# Patient Record
Sex: FEMALE | Race: BLACK OR AFRICAN AMERICAN | NOT HISPANIC OR LATINO | URBAN - METROPOLITAN AREA
[De-identification: names, ages, dates, MRNs, and addresses within clinical notes are randomized per-mention and may not be internally consistent; named-entity substitution may affect disease eponyms.]

---

## 2024-03-09 ENCOUNTER — HOSPITAL ENCOUNTER (EMERGENCY)
Facility: HOSPITAL | Age: 24
Discharge: HOME | End: 2024-03-09
Payer: COMMERCIAL

## 2024-03-09 DIAGNOSIS — B37.31 YEAST VAGINITIS: Primary | ICD-10-CM

## 2024-03-09 LAB
APPEARANCE UR: ABNORMAL
BACTERIA #/AREA URNS AUTO: ABNORMAL /HPF
BILIRUB UR STRIP.AUTO-MCNC: NEGATIVE MG/DL
CLUE CELLS SPEC QL WET PREP: ABNORMAL
COLOR UR: YELLOW
GLUCOSE UR STRIP.AUTO-MCNC: NEGATIVE MG/DL
HCG UR QL IA.RAPID: NEGATIVE
KETONES UR STRIP.AUTO-MCNC: NEGATIVE MG/DL
LEUKOCYTE ESTERASE UR QL STRIP.AUTO: ABNORMAL
MUCOUS THREADS #/AREA URNS AUTO: ABNORMAL /LPF
NITRITE UR QL STRIP.AUTO: NEGATIVE
PH UR STRIP.AUTO: 6 [PH]
PROT UR STRIP.AUTO-MCNC: NEGATIVE MG/DL
RBC # UR STRIP.AUTO: NEGATIVE /UL
RBC #/AREA URNS AUTO: ABNORMAL /HPF
SP GR UR STRIP.AUTO: 1.02
SQUAMOUS #/AREA URNS AUTO: ABNORMAL /HPF
T VAGINALIS SPEC QL WET PREP: ABNORMAL
UROBILINOGEN UR STRIP.AUTO-MCNC: 4 MG/DL
WBC #/AREA URNS AUTO: ABNORMAL /HPF
WBC VAG QL WET PREP: >50
YEAST VAG QL WET PREP: PRESENT

## 2024-03-09 PROCEDURE — 81025 URINE PREGNANCY TEST: CPT | Performed by: PHYSICIAN ASSISTANT

## 2024-03-09 PROCEDURE — 81001 URINALYSIS AUTO W/SCOPE: CPT | Performed by: PHYSICIAN ASSISTANT

## 2024-03-09 PROCEDURE — 87210 SMEAR WET MOUNT SALINE/INK: CPT | Performed by: PHYSICIAN ASSISTANT

## 2024-03-09 PROCEDURE — 87800 DETECT AGNT MULT DNA DIREC: CPT | Mod: AHULAB | Performed by: PHYSICIAN ASSISTANT

## 2024-03-09 PROCEDURE — 99283 EMERGENCY DEPT VISIT LOW MDM: CPT

## 2024-03-09 PROCEDURE — 87086 URINE CULTURE/COLONY COUNT: CPT | Mod: AHULAB | Performed by: PHYSICIAN ASSISTANT

## 2024-03-09 RX ORDER — FLUCONAZOLE 150 MG/1
150 TABLET ORAL ONCE
Qty: 1 TABLET | Refills: 0 | Status: SHIPPED | OUTPATIENT
Start: 2024-03-09 | End: 2024-03-11 | Stop reason: SDUPTHER

## 2024-03-09 NOTE — ED PROVIDER NOTES
HPI   No chief complaint on file.      Initial documentation evaluation on downtime.  Stable vital signs.    Unprotected sex 4 days ago.  Vaginal discomfort discharge.  Concern of yeast infection.  Also STD concern/exposure.  No abdominal pelvic pain.  Last menses 4 weeks ago.  No urinary symptoms.  Denies pregnancy.  Not on birth control.  Takes medication for anxiety.  No known allergies.    PMHX: HPI/EMR reviewed past medical history medications allergies.    REVIEW OF SYSTEMS: Positive and pertinent negatives as per HPI.  All other systems reviewed are negative    PHYSICAL EXAM:   General: Vitals noted. No distress. Afebrile  Constitutional: Well appearing, well nourished, awake, alert, oriented to person, place, time/situation and in no apparent distress.  Skin:  Intact, warm and dry skin.   HEENT: Normocephalic, atraumatic, No facial asymmetry. Eyelids without lesions. Pupils equal and round. EOM's intact.  Conjunctiva clear with no redness or exudates. Cornea clear. Sclerae anicteric. Hearing grossly intact. Normal phonation. Swallows and speaks without difficulty  Neck: Supple, full range of motion, no asymmetry or obvious swelling, no stridor.  Cardiovascular:  Blood pressure noted. Normal heart rate  Respiratory: No respiratory distress. Easy unlabored respirations. Speaks easily and clearly in full sentences  Abdomen: Soft nontender  Pelvic exam: Declined exam.  Will self swab  Musculoskeletal: Full range of motion of all extremities, symmetrical strength in all major muscle groups. No obvious deformities.  Vascular: No cyanosis, no pallor, no edema  Neurological: Normal mental status, alert and oriented x3, coherent and appropriate, no motor deficits. Ambulatory  Psychiatric: Appropriate mood and affect    Disclaimer: Portions of this note were dictated by speech recognition. An attempt at proof reading was made to minimize errors. Errors in transcription may be present.  Please call if  questions        History provided by:  Patient   used: No                        No data recorded                   Patient History   No past medical history on file.  No past surgical history on file.  No family history on file.  Social History     Tobacco Use    Smoking status: Not on file    Smokeless tobacco: Not on file   Substance Use Topics    Alcohol use: Not on file    Drug use: Not on file       Physical Exam   ED Triage Vitals   Temp Pulse Resp BP   -- -- -- --      SpO2 Temp src Heart Rate Source Patient Position   -- -- -- --      BP Location FiO2 (%)     -- --       Physical Exam    ED Course & [facility]   ED Course as of 03/09/24 1833   Sat Mar 09, 2024   1832 Not pregnant.  Wet prep positive for yeast.  Negative for trichomoniasis and clue cells.  Urinalysis leukocyte Estrace.  Patient is asymptomatic no urinary symptoms. [GA]      ED Course User Index  [GA] Teddy Haro PA-C         Diagnoses as of 03/09/24 1833   Yeast vaginitis     Labs Reviewed   WET PREP, GENITAL - Abnormal       Result Value    Trichomonas None Seen      Clue Cells None Seen      Yeast Present (*)     WBC >50     C. TRACHOMATIS + N. GONORRHOEAE, AMPLIFIED   URINALYSIS WITH REFLEX CULTURE AND MICROSCOPIC    Narrative:     The following orders were created for panel order Urinalysis with Reflex Culture and Microscopic.  Procedure                               Abnormality         Status                     ---------                               -----------         ------                     Urinalysis with Reflex C...[[id number]]                      In process                 Extra Urine Gray Tube[[id number]]                            In process                   Please view results for these tests on the individual orders.   HCG, URINE, QUALITATIVE   URINALYSIS WITH REFLEX CULTURE AND MICROSCOPIC   EXTRA URINE GRAY TUBE      Medical Decision Making  Evaluation consistent yeast vaginitis.  Prescribed  Diflucan.    Patient advised STD testing pending 3 days.  Patient left prior to results of urinalysis.  Positive leukocyte Estrace.  Patient has no UTI symptoms.  Stable for discharge outpatient management.    Amount and/or Complexity of Data Reviewed  Labs: ordered. Decision-making details documented in ED Course.     Details: As above        Procedure  Procedures     Teddy Haro PA-C  03/09/24 2052

## 2024-03-10 LAB — HOLD SPECIMEN: NORMAL

## 2024-03-11 LAB
BACTERIA UR CULT: NORMAL
C TRACH RRNA SPEC QL NAA+PROBE: NEGATIVE
N GONORRHOEA DNA SPEC QL PROBE+SIG AMP: NEGATIVE

## 2024-03-11 RX ORDER — FLUCONAZOLE 150 MG/1
150 TABLET ORAL ONCE
Qty: 1 TABLET | Refills: 0 | Status: SHIPPED | OUTPATIENT
Start: 2024-03-11 | End: 2024-03-11

## 2024-03-11 RX ORDER — FLUCONAZOLE 150 MG/1
150 TABLET ORAL ONCE
Qty: 1 TABLET | Refills: 0 | Status: SHIPPED | OUTPATIENT
Start: 2024-03-11 | End: 2024-03-11 | Stop reason: SDUPTHER

## 2024-09-10 ENCOUNTER — APPOINTMENT (OUTPATIENT)
Dept: OBSTETRICS AND GYNECOLOGY | Facility: CLINIC | Age: 24
End: 2024-09-10
Payer: COMMERCIAL

## 2025-08-07 ENCOUNTER — HOSPITAL ENCOUNTER (EMERGENCY)
Facility: HOSPITAL | Age: 25
Discharge: HOME | End: 2025-08-07
Payer: COMMERCIAL

## 2025-08-07 VITALS
RESPIRATION RATE: 16 BRPM | DIASTOLIC BLOOD PRESSURE: 80 MMHG | HEIGHT: 69 IN | SYSTOLIC BLOOD PRESSURE: 107 MMHG | BODY MASS INDEX: 23.11 KG/M2 | OXYGEN SATURATION: 98 % | TEMPERATURE: 98.7 F | HEART RATE: 77 BPM | WEIGHT: 156 LBS

## 2025-08-07 DIAGNOSIS — B96.89 BACTERIAL VAGINOSIS: ICD-10-CM

## 2025-08-07 DIAGNOSIS — Z30.09 BIRTH CONTROL COUNSELING: ICD-10-CM

## 2025-08-07 DIAGNOSIS — N76.0 BACTERIAL VAGINOSIS: ICD-10-CM

## 2025-08-07 DIAGNOSIS — N92.6 MISSED MENSES: ICD-10-CM

## 2025-08-07 DIAGNOSIS — Z20.2 POSSIBLE EXPOSURE TO STI: Primary | ICD-10-CM

## 2025-08-07 LAB
APPEARANCE UR: CLEAR
BILIRUB UR STRIP.AUTO-MCNC: NEGATIVE MG/DL
CLUE CELLS SPEC QL WET PREP: PRESENT
COLOR UR: YELLOW
GLUCOSE UR STRIP.AUTO-MCNC: NORMAL MG/DL
HCG UR QL IA.RAPID: NEGATIVE
KETONES UR STRIP.AUTO-MCNC: NEGATIVE MG/DL
LEUKOCYTE ESTERASE UR QL STRIP.AUTO: ABNORMAL
MUCOUS THREADS #/AREA URNS AUTO: NORMAL /LPF
NITRITE UR QL STRIP.AUTO: NEGATIVE
PH UR STRIP.AUTO: 6 [PH]
PROT UR STRIP.AUTO-MCNC: ABNORMAL MG/DL
RBC # UR STRIP.AUTO: NEGATIVE MG/DL
RBC #/AREA URNS AUTO: NORMAL /HPF
SP GR UR STRIP.AUTO: 1.02
SQUAMOUS #/AREA URNS AUTO: NORMAL /HPF
T VAGINALIS SPEC QL WET PREP: ABNORMAL
TRICHOMONAS REFLEX COMMENT: ABNORMAL
UROBILINOGEN UR STRIP.AUTO-MCNC: ABNORMAL MG/DL
WBC #/AREA URNS AUTO: NORMAL /HPF
WBC VAG QL WET PREP: ABNORMAL
YEAST VAG QL WET PREP: ABNORMAL

## 2025-08-07 PROCEDURE — 81001 URINALYSIS AUTO W/SCOPE: CPT | Performed by: NURSE PRACTITIONER

## 2025-08-07 PROCEDURE — 81025 URINE PREGNANCY TEST: CPT | Performed by: NURSE PRACTITIONER

## 2025-08-07 PROCEDURE — 99283 EMERGENCY DEPT VISIT LOW MDM: CPT

## 2025-08-07 PROCEDURE — 87661 TRICHOMONAS VAGINALIS AMPLIF: CPT | Mod: AHULAB | Performed by: NURSE PRACTITIONER

## 2025-08-07 PROCEDURE — 87491 CHLMYD TRACH DNA AMP PROBE: CPT | Mod: AHULAB | Performed by: NURSE PRACTITIONER

## 2025-08-07 PROCEDURE — 87210 SMEAR WET MOUNT SALINE/INK: CPT | Performed by: NURSE PRACTITIONER

## 2025-08-07 RX ORDER — METRONIDAZOLE 500 MG/1
500 TABLET ORAL 3 TIMES DAILY
Qty: 30 TABLET | Refills: 0 | Status: SHIPPED | OUTPATIENT
Start: 2025-08-07 | End: 2025-08-17

## 2025-08-07 RX ORDER — ONDANSETRON 4 MG/1
4 TABLET, FILM COATED ORAL EVERY 6 HOURS
Qty: 12 TABLET | Refills: 0 | Status: SHIPPED | OUTPATIENT
Start: 2025-08-07 | End: 2025-08-10

## 2025-08-07 ASSESSMENT — PAIN SCALES - GENERAL: PAINLEVEL_OUTOF10: 0 - NO PAIN

## 2025-08-07 ASSESSMENT — PAIN - FUNCTIONAL ASSESSMENT: PAIN_FUNCTIONAL_ASSESSMENT: 0-10

## 2025-08-07 NOTE — Clinical Note
Ana Bermudez was seen and treated in our emergency department on 8/7/2025.  She may return to work on 08/08/2025.       If you have any questions or concerns, please don't hesitate to call.      Ning Mahajan, APRN-CNP

## 2025-08-07 NOTE — DISCHARGE INSTRUCTIONS
Finish antibiotics even if better    Watch for final urine results  Return if needed  Pregnancy is negative

## 2025-08-07 NOTE — ED PROVIDER NOTES
[facility]  ED  Encounter Note  Admit Date/RoomTime: 2025 11:17 AM  ED Room: FT01/FT01  NAME: Ana Bermudez  : 2000  MRN: [id number]     Chief Complaint:  Exposure to STD    HISTORY OF PRESENT ILLNESS        Ana Bermudez is a 25 y.o. female who presents to the ED for evaluation of sti exposure. Pt had one night stand unprotected and feels has vaginal irritation last few days. Hx of chlamydia in the past. No abdominal pain. ? Missed menses and would like pregnancy test. Has PCP but wants gyn referral to start birth control.     ROS   Pertinent positives and negatives are stated within HPI, all other systems reviewed and are negative.    Past Medical History:  has no past medical history on file.    Surgical History:  has no past surgical history on file.    Social History:   denied nicotine alcohol illicit     Family History: family history is not on file.     Allergies: Patient has no known allergies.    PHYSICAL EXAM   Oxygen Saturation Interpretation: Normal.      Physical Exam  Constitutional/General: Alert and oriented x3, well appearing, non toxic  HEENT:  NC/NT. PERRLA.  Airway patent.  Neck: Supple, full ROM. No midline vertebral tenderness or crepitus.   Respiratory: Lung sounds clear to auscultation bilaterally. No wheezes, rhonchi or stridor. Not in respiratory distress.  CV:  Regular rate. Regular rhythm. No murmurs or rubs. 2+ distal pulses.  GI:  Abdomen soft, non-tender, non-distended. +BS. No rebound, guarding, or rigidity. No pulsatile masses.  Musculoskeletal: Moves all extremities x 4. Warm and well perfused. Capillary refill <3 seconds  Integument: Skin warm and dry. No rashes.   Neurologic: Alert and oriented with no focal deficits, symmetric strength 5/5 in the upper and lower extremities bilaterally.  Psychiatric: Normal affect.    Lab / Imaging Results   (All laboratory and radiology results have been personally reviewed by myself)  Labs:  Results  for orders placed or performed during the hospital encounter of 08/07/25   Urinalysis with Reflex Microscopic    Collection Time: 08/07/25 11:39 AM   Result Value Ref Range    Color, Urine Yellow Light-Yellow, Yellow, Dark-Yellow    Appearance, Urine Clear Clear    Specific Gravity, Urine 1.022 1.005 - 1.035    pH, Urine 6.0 5.0, 5.5, 6.0, 6.5, 7.0, 7.5, 8.0    Protein, Urine 10 (TRACE) NEGATIVE, 10 (TRACE), 20 (TRACE) mg/dL    Glucose, Urine Normal Normal mg/dL    Blood, Urine NEGATIVE NEGATIVE mg/dL    Ketones, Urine NEGATIVE NEGATIVE mg/dL    Bilirubin, Urine NEGATIVE NEGATIVE mg/dL    Urobilinogen, Urine 2 (1+) (A) Normal mg/dL    Nitrite, Urine NEGATIVE NEGATIVE    Leukocyte Esterase, Urine 25 Lucy/uL (A) NEGATIVE   hCG, Urine, Qualitative    Collection Time: 08/07/25 11:39 AM   Result Value Ref Range    HCG, Urine NEGATIVE NEGATIVE   Microscopic Only, Urine    Collection Time: 08/07/25 11:39 AM   Result Value Ref Range    WBC, Urine 1-5 1-5, NONE /HPF    RBC, Urine 1-2 NONE, 1-2, 3-5 /HPF    Squamous Epithelial Cells, Urine 1-9 (SPARSE) Reference range not established. /HPF    Mucus, Urine FEW Reference range not established. /LPF   Wet Prep with Trichomonas Reflex If Neg    Collection Time: 08/07/25 11:53 AM   Result Value Ref Range    Trichomonas None Seen None Seen    Clue Cells Present (A) None Seen    Yeast None Seen None Seen    WBC 1-2     Trichomonas reflex comment       Trichomonas was not seen by wet prep. Reflex Trichomonas vaginalis by Amplified Detection.     Imaging:  All Radiology results interpreted by Radiologist unless otherwise noted.  No orders to display       ED Course / Medical Decision Making   Medications - No data to display  Diagnoses as of 08/07/25 1231   Possible exposure to STI   Missed menses   Bacterial vaginosis   Birth control counseling     Re-examination:  stable        MDM:     Urine showed + leuk, await culture  + clue cells consistent with bacterial vaginitis.   Starting  flagyl for 10 days and zofran as needed, avoid alcohol  Final sti cultures pending. Safe sex encouraged. Referred to gyn for birth control. Would like nexaplon. No pregnancy planned for at least 3 yrs.  Followup for repeat testing in a few weeks  Ddx: exposure to sti     Plan of Care/Counseling:  I reviewed today's visit with the patient  in addition to providing specific details for the plan of care and counseling regarding the diagnosis and prognosis.  Questions are answered at this time and are agreeable with the plan.    ASSESSMENT     1. Possible exposure to STI    2. Missed menses    3. Bacterial vaginosis    4. Birth control counseling      PLAN   Home Advised to return for signs of head injury, weakness, numbness or tingling to extremities, incontinence and Advised to return for worsening or additional problems such as abdominal or chest pain  Diagnostic tests were reviewed and questions answered. Diagnosis, care plan and treatment options were discussed. The patient understand instructions and will follow up as directed.  Condition stable  The patient was given verbal follow-up instructions  Patient condition is stable    New Medications     New Medications Ordered This Visit   Medications    ondansetron (Zofran) 4 mg tablet     Sig: Take 1 tablet (4 mg) by mouth every 6 hours for 3 days.     Dispense:  12 tablet     Refill:  0    metroNIDAZOLE (Flagyl) 500 mg tablet     Sig: Take 1 tablet (500 mg) by mouth 3 times a day for 10 days.     Dispense:  30 tablet     Refill:  0     Electronically signed by WAYLON Pederson   **This report was transcribed using voice recognition software. Every effort was made to ensure accuracy; however, inadvertent computerized transcription errors may be present.  END OF ED PROVIDER NOTE     WAYLON Pederson  08/07/25 [id number]

## 2025-08-08 LAB
C TRACH RRNA SPEC QL NAA+PROBE: NEGATIVE
C TRACH RRNA SPEC QL NAA+PROBE: NEGATIVE
N GONORRHOEA DNA SPEC QL PROBE+SIG AMP: NEGATIVE
N GONORRHOEA DNA SPEC QL PROBE+SIG AMP: NEGATIVE
T VAGINALIS RRNA SPEC QL NAA+PROBE: NEGATIVE

## 2025-08-26 ENCOUNTER — APPOINTMENT (OUTPATIENT)
Dept: OBSTETRICS AND GYNECOLOGY | Facility: CLINIC | Age: 25
End: 2025-08-26
Payer: COMMERCIAL